# Patient Record
Sex: FEMALE | Race: ASIAN | NOT HISPANIC OR LATINO | Employment: FULL TIME | URBAN - METROPOLITAN AREA
[De-identification: names, ages, dates, MRNs, and addresses within clinical notes are randomized per-mention and may not be internally consistent; named-entity substitution may affect disease eponyms.]

---

## 2017-03-29 ENCOUNTER — TRANSFERRED RECORDS (OUTPATIENT)
Dept: HEALTH INFORMATION MANAGEMENT | Facility: CLINIC | Age: 32
End: 2017-03-29

## 2017-03-29 DIAGNOSIS — Z53.9 ERRONEOUS ENCOUNTER--DISREGARD: Primary | ICD-10-CM

## 2017-07-31 ENCOUNTER — OFFICE VISIT (OUTPATIENT)
Dept: URGENT CARE | Facility: URGENT CARE | Age: 32
End: 2017-07-31
Payer: COMMERCIAL

## 2017-07-31 VITALS
WEIGHT: 243.4 LBS | TEMPERATURE: 98.6 F | SYSTOLIC BLOOD PRESSURE: 112 MMHG | HEART RATE: 82 BPM | OXYGEN SATURATION: 98 % | HEIGHT: 63 IN | DIASTOLIC BLOOD PRESSURE: 74 MMHG | RESPIRATION RATE: 16 BRPM | BODY MASS INDEX: 43.12 KG/M2

## 2017-07-31 DIAGNOSIS — J98.01 BRONCHOSPASM: ICD-10-CM

## 2017-07-31 DIAGNOSIS — J06.9 VIRAL URI: Primary | ICD-10-CM

## 2017-07-31 PROCEDURE — 99213 OFFICE O/P EST LOW 20 MIN: CPT | Performed by: FAMILY MEDICINE

## 2017-07-31 RX ORDER — ALBUTEROL SULFATE 90 UG/1
2-3 AEROSOL, METERED RESPIRATORY (INHALATION) EVERY 4 HOURS PRN
Qty: 1 INHALER | Refills: 1 | Status: SHIPPED | OUTPATIENT
Start: 2017-07-31 | End: 2021-06-07

## 2017-07-31 ASSESSMENT — PAIN SCALES - GENERAL: PAINLEVEL: NO PAIN (0)

## 2017-07-31 NOTE — PROGRESS NOTES
Some of this note was populated by a medical assistant.      SUBJECTIVE:                                                    Eliz Fortune is a 31 year old female who presents to clinic today for the following health issues:      RESPIRATORY SYMPTOMS -- follow up, pt went to [facility] on 7/25/17 (strep was negative)       Duration: 7/23/17    Description  nasal congestion, sore throat, dry cough, fever, SOB, chest tightness, HA        Severity: severe    Accompanying signs and symptoms: body aches, trouble sleeping (related to breathing issue)    History (predisposing factors):  none    Precipitating or alleviating factors: sudafed, armando seltzer - seemed to help for a short period of time    Therapies tried and outcome:  Vitamins, OJ, Rest -       Denies heart of lung hx   Denies hx of PE or hospitalization or surgery.   Hx of bronchitis few years ago. Denies pneumonia.   Denies travel outside country.     Problem list and histories reviewed & adjusted, as indicated.  Additional history: as documented    Patient Active Problem List   Diagnosis     Back strain     History reviewed. No pertinent surgical history.    Social History   Substance Use Topics     Smoking status: Never Smoker     Smokeless tobacco: Never Used     Alcohol use No     History reviewed. No pertinent family history.      Current Outpatient Prescriptions   Medication Sig Dispense Refill     Ibuprofen (ADVIL PO)        Phenyleph-Doxylamine-DM-APAP (ARMANDO SELTZER PLUS PO)        No Known Allergies      Reviewed and updated as needed this visit by clinical staffTobacco  Allergies  Meds  Med Hx  Surg Hx  Fam Hx  Soc Hx      Reviewed and updated as needed this visit by Provider         ROS:  Constitutional, HEENT, cardiovascular, pulmonary, gi and gu systems are negative, except as otherwise noted.      OBJECTIVE:   /74 (BP Location: Left arm, Patient Position: Left side, Cuff Size: Adult Large)  Pulse 82  Temp 98.6  F (37  C) (Oral)  " Resp 16  Ht 1.594 m (5' 2.75\")  Wt 110.4 kg (243 lb 6.4 oz)  LMP 07/14/2017 (Exact Date)  SpO2 98%  BMI 43.46 kg/m2  Body mass index is 43.46 kg/(m^2).  GENERAL: healthy, alert and no distress  NECK: no adenopathy, no asymmetry, masses, or scars and thyroid normal to palpation  RESP: lungs clear to auscultation - minimal expiratory wheeze without appreciable rhonci   CV: regular rate and rhythm, normal S1 S2, no S3 or S4, no murmur, click or rub, no peripheral edema and peripheral pulses strong  ABDOMEN: soft, nontender, no hepatosplenomegaly, no masses and bowel sounds normal  MS: no gross musculoskeletal defects noted, no edema    Diagnostic Test Results:  none     ASSESSMENT/PLAN:       ICD-10-CM    1. Viral URI J06.9 albuterol (PROAIR HFA/PROVENTIL HFA/VENTOLIN HFA) 108 (90 BASE) MCG/ACT Inhaler    B97.89    2. Bronchospasm J98.01 albuterol (PROAIR HFA/PROVENTIL HFA/VENTOLIN HFA) 108 (90 BASE) MCG/ACT Inhaler        PLAN  Patient educational/instructional material provided including reasons for follow-up    The patient indicates understanding of these issues and agrees with the plan.  Emile Barnes MD      [facility]    "

## 2017-07-31 NOTE — MR AVS SNAPSHOT
"              After Visit Summary   2017    Eliz Fortune    MRN: [id number]           Patient Information     Date Of Birth          1985        Visit Information        Provider Department      2017 6:20 PM Emile Barnes MD [facility]        Today's Diagnoses     Viral URI    -  1    Bronchospasm           Follow-ups after your visit        Who to contact     If you have questions or need follow up information about today's clinic visit or your schedule please contact [facility] directly at [phone].  Normal or non-critical lab and imaging results will be communicated to you by IMT ([employer])hart, letter or phone within 4 business days after the clinic has received the results. If you do not hear from us within 7 days, please contact the clinic through IMT ([employer])hart or phone. If you have a critical or abnormal lab result, we will notify you by phone as soon as possible.  Submit refill requests through tagWALLET or call your pharmacy and they will forward the refill request to us. Please allow 3 business days for your refill to be completed.          Additional Information About Your Visit        MyChart Information     tagWALLET lets you send messages to your doctor, view your test results, renew your prescriptions, schedule appointments and more. To sign up, go to [email or web address] . Click on \"Log in\" on the left side of the screen, which will take you to the Welcome page. Then click on \"Sign up Now\" on the right side of the page.     You will be asked to enter the access code listed below, as well as some personal information. Please follow the directions to create your username and password.     Your access code is: [email or web address]  Expires: 10/29/2017  6:54 PM     Your access code will  in 90 days. If you need help or a new code, please call your [facility] or [phone].        Care EveryWhere ID     This is your Care EveryWhere ID. This could " "be used by other organizations to access your [place] medical records  [id number]        Your Vitals Were     Pulse Temperature Respirations Height Last Period Pulse Oximetry    82 98.6  F (37  C) (Oral) 16 1.594 m (5' 2.75\") 07/14/2017 (Exact Date) 98%    BMI (Body Mass Index)                   43.46 kg/m2            Blood Pressure from Last 3 Encounters:   07/31/17 112/74   04/29/15 107/73   04/28/15 114/80    Weight from Last 3 Encounters:   07/31/17 110.4 kg (243 lb 6.4 oz)   04/29/15 110.7 kg (244 lb)   04/28/15 110.7 kg (244 lb)              Today, you had the following     No orders found for display         Today's Medication Changes          These changes are accurate as of: 7/31/17  6:54 PM.  If you have any questions, ask your nurse or doctor.               Start taking these medicines.        Dose/Directions    albuterol 108 (90 BASE) MCG/ACT Inhaler   Commonly known as:  PROAIR HFA/PROVENTIL HFA/VENTOLIN HFA   Used for:  Viral URI, Bronchospasm   Started by:  Emile Barnes MD        Dose:  2-3 puff   Inhale 2-3 puffs into the lungs every 4 hours as needed for shortness of breath / dyspnea or wheezing   Quantity:  1 Inhaler   Refills:  1            Where to get your medicines      These medications were sent to [id number] - [place], [place] - [address]  [address] [address]     Phone:  [phone]     albuterol 108 (90 BASE) MCG/ACT Inhaler                Primary Care Provider Office Phone # Fax #    Parisa Birch [phone]       [address] [address]        Equal Access to Services     JAVIER GODWIN : Anayeli Hassan, waaxda luqadaha, qaybta kaalmada bernice, gerber beckham. So [facility] [phone].    ATENCIÓN: Si habla español, tiene a gutierrez disposición servicios gratuitos de asistencia lingüística. Llharvey al [phone].    We comply with applicable " federal civil rights laws and [place] laws. We do not discriminate on the basis of race, color, national origin, age, disability sex, sexual orientation or gender identity.            Thank you!     Thank you for choosing [facility]  for your care. Our goal is always to provide you with excellent care. Hearing back from our patients is one way we can continue to improve our services. Please take a few minutes to complete the written survey that you may receive in the mail after your visit with us. Thank you!             Your Updated Medication List - Protect others around you: Learn how to safely use, store and throw away your medicines at www.disposemymeds.org.          This list is accurate as of: 7/31/17  6:54 PM.  Always use your most recent med list.                   Brand Name Dispense Instructions for use Diagnosis    ADVIL PO           albuterol 108 (90 BASE) MCG/ACT Inhaler    PROAIR HFA/PROVENTIL HFA/VENTOLIN HFA    1 Inhaler    Inhale 2-3 puffs into the lungs every 4 hours as needed for shortness of breath / dyspnea or wheezing    Viral URI, Bronchospasm       ARMANDO SELTZER PLUS PO

## 2017-07-31 NOTE — NURSING NOTE
"Chief Complaint   Patient presents with     Pharyngitis     Cough       Initial /74 (BP Location: Left arm, Patient Position: Left side, Cuff Size: Adult Large)  Pulse 82  Temp 98.6  F (37  C) (Oral)  Resp 16  Ht 1.594 m (5' 2.75\")  Wt 110.4 kg (243 lb 6.4 oz)  LMP 07/14/2017 (Exact Date)  SpO2 98%  BMI 43.46 kg/m2 Estimated body mass index is 43.46 kg/(m^2) as calculated from the following:    Height as of this encounter: 1.594 m (5' 2.75\").    Weight as of this encounter: 110.4 kg (243 lb 6.4 oz).  Medication Reconciliation: complete     Will Soto ZARAGOZA      "

## 2017-07-31 NOTE — LETTER
[facility]  [address]  [facility] [address]  Phone: [phone]    July 31, 2017        Eliz Fortune  [address]  [place] [facility] [place] [address]          To whom it may concern:    RE: Eliz Fortune    Patient was seen and treated today at our clinic and having ongoing viral common cold symptoms. Therefore, she should stay home tomorrow and rest and possibly the following day as she is likely contagious. Return to work when cough and fever have improved. Follow-up if symptoms persist or worsen.          Sincerely,          Emile Barnes MD

## 2017-08-17 ENCOUNTER — OFFICE VISIT (OUTPATIENT)
Dept: FAMILY MEDICINE | Facility: CLINIC | Age: 32
End: 2017-08-17
Payer: COMMERCIAL

## 2017-08-17 VITALS
TEMPERATURE: 98.8 F | BODY MASS INDEX: 43.59 KG/M2 | DIASTOLIC BLOOD PRESSURE: 71 MMHG | SYSTOLIC BLOOD PRESSURE: 125 MMHG | OXYGEN SATURATION: 98 % | WEIGHT: 246 LBS | HEART RATE: 81 BPM | HEIGHT: 63 IN

## 2017-08-17 DIAGNOSIS — S61.213D LACERATION OF LEFT MIDDLE FINGER WITHOUT FOREIGN BODY WITHOUT DAMAGE TO NAIL, SUBSEQUENT ENCOUNTER: Primary | ICD-10-CM

## 2017-08-17 PROBLEM — E66.01 MORBID OBESITY WITH BMI OF 40.0-44.9, ADULT (H): Status: ACTIVE | Noted: 2017-08-17

## 2017-08-17 PROCEDURE — 99213 OFFICE O/P EST LOW 20 MIN: CPT | Performed by: FAMILY MEDICINE

## 2017-08-17 ASSESSMENT — PAIN SCALES - GENERAL: PAINLEVEL: NO PAIN (0)

## 2017-08-17 NOTE — PROGRESS NOTES
"  SUBJECTIVE:   Eliz Fortune is a 31 year old female who presents to clinic today for the following health issues:      ED/UC Followup:    Facility:  [facility]   Date of visit: 8/9/17  Reason for visit: finger laceration  Current Status: better     Concern - laceration  Onset: 8/9/17    Description:   Left middle finger laceration due to duffel bag strap digging into her finger    Intensity: mild    Progression of Symptoms:  improving    Accompanying Signs & Symptoms:  Some numbness  Smell (\"like dead flesh\")    Previous history of similar problem:   No     Precipitating factors:   Worsened by: NA    Alleviating factors:  Improved by: None.    Therapies Tried and outcome: had stitches.      ROS:  C: NEGATIVE for fever, chills, change in weight  E/M: NEGATIVE for ear, mouth and throat problems  R: NEGATIVE for significant cough or SOB  CV: NEGATIVE for chest pain, palpitations or peripheral edema  ROS otherwise negative    Any history above obtained by the Medical Assistant was reviewed by Dr. Jace Patiño MD, and edited when necessary.    This document serves as a record of the services and decisions personally performed and made by Dr. Patiño. It was created on his behalf by Stephanie Watson, a trained medical scribe. The creation of this document is based the provider's statements to the medical scribe.  Stephanie Watson August 17, 2017 5:35 PM     OBJECTIVE:                                                    /71 (BP Location: Right arm, Patient Position: Chair, Cuff Size: Adult Large)  Pulse 81  Temp 98.8  F (37.1  C) (Oral)  Ht 1.594 m (5' 2.75\")  Wt 111.6 kg (246 lb)  LMP 07/14/2017  SpO2 98%  BMI 43.92 kg/m2   Body mass index is 43.92 kg/(m^2).     GENERAL: healthy, alert and no distress  EYES: Eyes grossly normal to inspection, PERRL, EOMI, sclerae white and conjunctivae normal  MS: no gross musculoskeletal defects noted, no edema  SKIN: Approximately 12 mm linear laceration across the pad of " the distal middle finger. Skin is well approximated without evidence of bleeding or infection.   NEURO: Normal strength and tone, sensory exam grossly normal, mentation intact, oriented times 3 and cranial nerves 2-12 intact  PSYCH: mentation appears normal, affect normal/bright     Diagnostic Test Results:  none      ASSESSMENT/PLAN:                                                      (U48.179A) Laceration of left middle finger without foreign body without damage to nail, subsequent encounter  (primary encounter diagnosis)  Comment: Looks like this was also a friction burn injury along the same line.  Plan: The wound was cleansed with rubbing alcohol. The 3 sutures were removed without difficulty. Antibiotic ointment and bandage applied. Wound care discussed with the patient during the procedure. Follow up as needed. Patient is cautioned that the return of sensation will take several weeks.      The information in this document, created by the medical scribe for me, accurately reflects the services I personally performed and the decisions made by me. I have reviewed and approved this document for accuracy prior to leaving the patient care area. August 17, 2017 5:35 PM     Jace Patiño MD

## 2017-08-17 NOTE — MR AVS SNAPSHOT
"              After Visit Summary   8/17/2017    Eliz Fortune    MRN: [id number]           Patient Information     Date Of Birth          1985        Visit Information        Provider Department      8/17/2017 5:20 PM Jace Patiño MD [facility]        Today's Diagnoses     Laceration of left middle finger without foreign body without damage to nail, subsequent encounter    -  1      Care Instructions    Chief Complaint   Patient presents with     Hospital F/U     suture removal       Initial /71 (BP Location: Right arm, Patient Position: Chair, Cuff Size: Adult Large)  Pulse 81  Temp 98.8  F (37.1  C) (Oral)  Ht 5' 2.75\" (1.594 m)  Wt 246 lb (111.6 kg)  LMP 07/14/2017  SpO2 98%  BMI 43.92 kg/m2 Estimated body mass index is 43.92 kg/(m^2) as calculated from the following:    Height as of this encounter: 5' 2.75\" (1.594 m).    Weight as of this encounter: 246 lb (111.6 kg).  Medication Reconciliation: complete     Dorys Whitehead MA              Follow-ups after your visit        Follow-up notes from your care team     Return if symptoms worsen or fail to improve.      Who to contact     If you have questions or need follow up information about today's clinic visit or your schedule please contact [facility] directly at [phone].  Normal or non-critical lab and imaging results will be communicated to you by careersmorehart, letter or phone within 4 business days after the clinic has received the results. If you do not hear from us within 7 days, please contact the clinic through careersmorehart or phone. If you have a critical or abnormal lab result, we will notify you by phone as soon as possible.  Submit refill requests through [employer] or call your pharmacy and they will forward the refill request to us. Please allow 3 business days for your refill to be completed.          Additional Information About Your Visit        MyChart Information     [employer] lets you send " "messages to your doctor, view your test results, renew your prescriptions, schedule appointments and more. To sign up, go to [email or web address] . Click on \"Log in\" on the left side of the screen, which will take you to the Welcome page. Then click on \"Sign up Now\" on the right side of the page.     You will be asked to enter the access code listed below, as well as some personal information. Please follow the directions to create your username and password.     Your access code is: [email or web address]  Expires: 10/29/2017  6:54 PM     Your access code will  in 90 days. If you need help or a new code, please call your [place] clinic or [phone].        Care EveryWhere ID     This is your Care EveryWhere ID. This could be used by other organizations to access your [facility] medical records  [id number]        Your Vitals Were     Pulse Temperature Height Last Period Pulse Oximetry BMI (Body Mass Index)    81 98.8  F (37.1  C) (Oral) 5' 2.75\" (1.594 m) 2017 98% 43.92 kg/m2       Blood Pressure from Last 3 Encounters:   17 125/71   17 112/74   04/29/15 107/73    Weight from Last 3 Encounters:   17 246 lb (111.6 kg)   17 243 lb 6.4 oz (110.4 kg)   04/29/15 244 lb (110.7 kg)              Today, you had the following     No orders found for display       Primary Care Provider Office Phone # Fax #    Parisa Birch [phone] [address]  [facility] [address]        Equal Access to Services     [facility] CITLALI AH: Hadwagner Hassan, jeimy ruvalcaba, eb vegaalmanitish queen, gerber beckham. So [facility] [phone].    ATENCIÓN: Si habla español, tiene a gutierrez disposición servicios gratuitos de asistencia lingüística. Llame al [phone].    We comply with applicable federal civil rights laws and [place] laws. We do not discriminate on the basis of race, color, national origin, age, disability sex, sexual orientation or " gender identity.            Thank you!     Thank you for choosing [facility]  for your care. Our goal is always to provide you with excellent care. Hearing back from our patients is one way we can continue to improve our services. Please take a few minutes to complete the written survey that you may receive in the mail after your visit with us. Thank you!             Your Updated Medication List - Protect others around you: Learn how to safely use, store and throw away your medicines at www.disposemymeds.org.          This list is accurate as of: 8/17/17  6:34 PM.  Always use your most recent med list.                   Brand Name Dispense Instructions for use Diagnosis    albuterol 108 (90 BASE) MCG/ACT Inhaler    PROAIR HFA/PROVENTIL HFA/VENTOLIN HFA    1 Inhaler    Inhale 2-3 puffs into the lungs every 4 hours as needed for shortness of breath / dyspnea or wheezing    Viral URI, Bronchospasm

## 2017-08-17 NOTE — PATIENT INSTRUCTIONS
"Chief Complaint   Patient presents with     Hospital F/U     suture removal       Initial /71 (BP Location: Right arm, Patient Position: Chair, Cuff Size: Adult Large)  Pulse 81  Temp 98.8  F (37.1  C) (Oral)  Ht 5' 2.75\" (1.594 m)  Wt 246 lb (111.6 kg)  LMP 07/14/2017  SpO2 98%  BMI 43.92 kg/m2 Estimated body mass index is 43.92 kg/(m^2) as calculated from the following:    Height as of this encounter: 5' 2.75\" (1.594 m).    Weight as of this encounter: 246 lb (111.6 kg).  Medication Reconciliation: complete     Dorys Whitehead MA      "

## 2018-07-24 ENCOUNTER — RADIANT APPOINTMENT (OUTPATIENT)
Dept: ULTRASOUND IMAGING | Facility: CLINIC | Age: 33
End: 2018-07-24
Attending: PHYSICIAN ASSISTANT
Payer: COMMERCIAL

## 2018-07-24 ENCOUNTER — OFFICE VISIT (OUTPATIENT)
Dept: FAMILY MEDICINE | Facility: CLINIC | Age: 33
End: 2018-07-24
Payer: COMMERCIAL

## 2018-07-24 VITALS
HEART RATE: 78 BPM | HEIGHT: 63 IN | WEIGHT: 235.8 LBS | OXYGEN SATURATION: 97 % | TEMPERATURE: 98.7 F | DIASTOLIC BLOOD PRESSURE: 81 MMHG | BODY MASS INDEX: 41.78 KG/M2 | SYSTOLIC BLOOD PRESSURE: 128 MMHG

## 2018-07-24 DIAGNOSIS — E66.01 MORBID OBESITY WITH BMI OF 40.0-44.9, ADULT (H): ICD-10-CM

## 2018-07-24 DIAGNOSIS — Z32.01 PREGNANCY CONFIRMED BY POSITIVE URINE TEST: ICD-10-CM

## 2018-07-24 DIAGNOSIS — Z32.01 PREGNANCY CONFIRMED BY POSITIVE URINE TEST: Primary | ICD-10-CM

## 2018-07-24 DIAGNOSIS — M79.662 PAIN OF LEFT LOWER LEG: ICD-10-CM

## 2018-07-24 LAB — BETA HCG QUAL IFA URINE: POSITIVE

## 2018-07-24 PROCEDURE — 99214 OFFICE O/P EST MOD 30 MIN: CPT | Performed by: PHYSICIAN ASSISTANT

## 2018-07-24 PROCEDURE — 84703 CHORIONIC GONADOTROPIN ASSAY: CPT | Performed by: PHYSICIAN ASSISTANT

## 2018-07-24 PROCEDURE — 93971 EXTREMITY STUDY: CPT | Mod: LT

## 2018-07-24 RX ORDER — CYCLOBENZAPRINE HCL 10 MG
10 TABLET ORAL 2 TIMES DAILY PRN
Qty: 30 TABLET | Refills: 0 | Status: SHIPPED | OUTPATIENT
Start: 2018-07-24 | End: 2021-06-07

## 2018-07-24 RX ORDER — PRENATAL VIT/IRON FUM/FOLIC AC 27MG-0.8MG
1 TABLET ORAL DAILY
Qty: 100 TABLET | Refills: 3 | Status: SHIPPED | OUTPATIENT
Start: 2018-07-24 | End: 2021-06-07

## 2018-07-24 ASSESSMENT — PAIN SCALES - GENERAL: PAINLEVEL: MODERATE PAIN (4)

## 2018-07-24 NOTE — MR AVS SNAPSHOT
After Visit Summary   7/24/2018    Eliz Fortune    MRN: [id number]           Patient Information     Date Of Birth          1985        Visit Information        Provider Department      7/24/2018 9:20 AM Nida Shankar PA-C [facility]        Today's Diagnoses     Pregnancy confirmed by positive urine test    -  1    Pain of left lower leg        Morbid obesity with BMI of 40.0-44.9, adult (H)          Care Instructions    JOYCE 3/22/19  EGA 5 week 4 days     Make appointment with OBGYN at 8 weeks of gestation in 2.5 weeks   Start prenatal vitamins     For leg,  Take Tylenol 500 mg every 6 hours as needed for pain  Flexeril 10 mg twice a day as needed for spasms    Get US done today to rule out blood clot  [facility]: arrive at 12:45pm           Follow-ups after your visit        Additional Services     OB/GYN REFERRAL       Your provider has referred you to:  [facility]: [facility] [phone]   [email or web address]     Please be aware that coverage of these services is subject to the terms and limitations of your health insurance plan.  Call member services at your health plan with any benefit or coverage questions.      Please bring the following with you to your appointment:    (1) Any X-Rays, CTs or MRIs which have been performed.  Contact the facility where they were done to arrange for  prior to your scheduled appointment.   (2) List of current medications   (3) This referral request   (4) Any documents/labs given to you for this referral                  Your next 10 appointments already scheduled     Jul 24, 2018  1:00 PM CDT   US LOWER EXTREMITY VENOUS DUPLEX LEFT with [id number]   [facility]    [address] [place] [id number] [phone]           Please bring a list of your medicines (including vitamins,  "minerals and over-the-counter drugs). Also, tell your doctor about any allergies you may have. Wear comfortable clothes and leave your valuables at home.  You do not need to do anything special to prepare for your exam.  Please call the Imaging Department at your exam site with any questions.              Future tests that were ordered for you today     Open Future Orders        Priority Expected Expires Ordered    US Lower Extremity Venous Duplex Left Routine  2019            Who to contact     If you have questions or need follow up information about today's clinic visit or your schedule please contact [facility] directly at [phone].  Normal or non-critical lab and imaging results will be communicated to you by MyChart, letter or phone within 4 business days after the clinic has received the results. If you do not hear from us within 7 days, please contact the clinic through [employer] or phone. If you have a critical or abnormal lab result, we will notify you by phone as soon as possible.  Submit refill requests through [email or web address] or call your pharmacy and they will forward the refill request to us. Please allow 3 business days for your refill to be completed.          Additional Information About Your Visit        [employer] Information     [email or web address] lets you send messages to your doctor, view your test results, renew your prescriptions, schedule appointments and more. To sign up, go to [email or web address] . Click on \"Log in\" on the left side of the screen, which will take you to the Welcome page. Then click on \"Sign up Now\" on the right side of the page.     You will be asked to enter the access code listed below, as well as some personal information. Please follow the directions to create your username and password.     Your access code is: [email or web address]  Expires: 10/22/2018 10:03 AM     Your access code will  in 90 days. If you need help or a new code, please call your " "[facility] or [phone].        Care EveryWhere ID     This is your Care EveryWhere ID. This could be used by other organizations to access your [facility] medical records  [id number]        Your Vitals Were     Pulse Temperature Height Last Period Pulse Oximetry BMI (Body Mass Index)    78 98.7  F (37.1  C) (Oral) 5' 3\" (1.6 m) 06/15/2018 (Approximate) 97% 41.77 kg/m2       Blood Pressure from Last 3 Encounters:   07/24/18 128/81   08/17/17 125/71   07/31/17 112/74    Weight from Last 3 Encounters:   07/24/18 235 lb 12.8 oz (107 kg)   08/17/17 246 lb (111.6 kg)   07/31/17 243 lb 6.4 oz (110.4 kg)              We Performed the Following     Beta HCG Qual, Urine - [facility] ([id number])     OB/GYN REFERRAL          Today's Medication Changes          These changes are accurate as of 7/24/18 10:03 AM.  If you have any questions, ask your nurse or doctor.               Start taking these medicines.        Dose/Directions    prenatal multivitamin plus iron 27-0.8 MG Tabs per tablet   Used for:  Pregnancy confirmed by positive urine test   Started by:  Nida Shankar PA-C        Dose:  1 tablet   Take 1 tablet by mouth daily   Quantity:  100 tablet   Refills:  3            Where to get your medicines      These medications were sent to [id number] [place], [place] - [address]  [address] [facility] [address]     Phone:  [phone]     prenatal multivitamin plus iron 27-0.8 MG Tabs per tablet                Primary Care Provider Office Phone # Fax #    Parisa Birch [phone] [address] [facility] [address] [facility] [facility] [address]        Equal Access to Services     [facility] CITLALI : Anayeli coronado Somisty, waaxda luqadaha, qaybta kaalmada adeshirleyyanitish, gerber beckham. So [facility] [phone].    ATENCIÓN: Si habla español, tiene a gutierrez disposición servicios gratuitos de asistencia lingüística. Llame al [phone].    We " comply with applicable federal civil rights laws and [place] laws. We do not discriminate on the basis of race, color, national origin, age, disability, sex, sexual orientation, or gender identity.            Thank you!     Thank you for choosing [facility]  for your care. Our goal is always to provide you with excellent care. Hearing back from our patients is one way we can continue to improve our services. Please take a few minutes to complete the written survey that you may receive in the mail after your visit with us. Thank you!             Your Updated Medication List - Protect others around you: Learn how to safely use, store and throw away your medicines at www.disposemymeds.org.          This list is accurate as of 7/24/18 10:03 AM.  Always use your most recent med list.                   Brand Name Dispense Instructions for use Diagnosis    albuterol 108 (90 Base) MCG/ACT Inhaler    PROAIR HFA/PROVENTIL HFA/VENTOLIN HFA    1 Inhaler    Inhale 2-3 puffs into the lungs every 4 hours as needed for shortness of breath / dyspnea or wheezing    Viral URI, Bronchospasm       prenatal multivitamin plus iron 27-0.8 MG Tabs per tablet     100 tablet    Take 1 tablet by mouth daily    Pregnancy confirmed by positive urine test

## 2018-07-24 NOTE — PROGRESS NOTES
SUBJECTIVE:   Eliz Fortune is a 32 year old female who presents to clinic today for the following health issues:    Concern - Left leg pain  Onset: x5 days     Description:   Left calf pain    Intensity: severe    Progression of Symptoms:  worsening    Accompanying Signs & Symptoms:  Pt states it is a cramping pain that can radiate in thigh.     Previous history of similar problem:   Pt states she has had alcira horses before     Precipitating factors:   Worsened by: laying down     Alleviating factors:  Improved by: heat   Patient denies any trauma. Patient reports that she has been laying a lot lately, has not done any gardening or, no exercise, no lifting, no fall. She does do regular housework.   Therapies Tried and outcome: increasing potassium, ice, heat, muscle rub, warm shower, massage, tylenol, and advil       Confirmation of Pregnancy:  LMP: 6/15/2018  Pt states she took a at home pregnancy test and it was positive.  Pt has hx of PCOS. Pt has been trying to get pregnant for multiple years        Problem list and histories reviewed & adjusted, as indicated.  Additional history: as documented    Patient Active Problem List   Diagnosis     Morbid obesity with BMI of 40.0-44.9, adult (H)     History reviewed. No pertinent surgical history.    Social History   Substance Use Topics     Smoking status: Never Smoker     Smokeless tobacco: Never Used     Alcohol use No     History reviewed. No pertinent family history.      Current Outpatient Prescriptions   Medication Sig Dispense Refill     albuterol (PROAIR HFA/PROVENTIL HFA/VENTOLIN HFA) 108 (90 BASE) MCG/ACT Inhaler Inhale 2-3 puffs into the lungs every 4 hours as needed for shortness of breath / dyspnea or wheezing (Patient not taking: Reported on 7/24/2018) 1 Inhaler 1     cyclobenzaprine (FLEXERIL) 10 MG tablet Take 1 tablet (10 mg) by mouth 2 times daily as needed for muscle spasms 30 tablet 0     Prenatal Vit-Fe Fumarate-FA (PRENATAL MULTIVITAMIN  "PLUS IRON) 27-0.8 MG TABS per tablet Take 1 tablet by mouth daily 100 tablet 3     No Known Allergies    Reviewed and updated as needed this visit by clinical staff  Tobacco  Allergies  Meds  Problems  Med Hx  Surg Hx  Fam Hx  Soc Hx        Reviewed and updated as needed this visit by Provider  Allergies  Meds  Problems         ROS:  Constitutional, HEENT, cardiovascular, pulmonary, GI, , musculoskeletal, neuro, skin, endocrine and psych systems are negative, except as otherwise noted.    OBJECTIVE:     /81 (BP Location: Right arm, Patient Position: Sitting, Cuff Size: Adult Regular)  Pulse 78  Temp 98.7  F (37.1  C) (Oral)  Ht 5' 3\" (1.6 m)  Wt 235 lb 12.8 oz (107 kg)  LMP 06/15/2018 (Approximate)  SpO2 97%  BMI 41.77 kg/m2  Body mass index is 41.77 kg/(m^2).  GENERAL: healthy, alert and no distress  NECK: no adenopathy, no asymmetry, masses, or scars and thyroid normal to palpation  RESP: lungs clear to auscultation - no rales, rhonchi or wheezes  CV: regular rate and rhythm, normal S1 S2, no S3 or S4, no murmur, click or rub, no peripheral edema and peripheral pulses strong  ABDOMEN: soft, nontender, no hepatosplenomegaly, no masses and bowel sounds normal  MS: no gross musculoskeletal defects noted, no edema, left leg, no swelling, no erythema, normal temperature to touch, Homans test is positive, very tender calf on palpation     Diagnostic Test Results:  Results for orders placed or performed in visit on 07/24/18 (from the past 24 hour(s))   Beta HCG Qual, Urine - [facility] ([id number])   Result Value Ref Range    Beta HCG Qual IFA Urine Positive (A) NEG^Negative        Referred to have US of left lower leg    ASSESSMENT/PLAN:       ICD-10-CM    1. Pregnancy confirmed by positive urine test Z32.01 OB/GYN REFERRAL     US Lower Extremity Venous Duplex Left     Prenatal Vit-Fe Fumarate-FA (PRENATAL MULTIVITAMIN PLUS IRON) 27-0.8 MG TABS per tablet   2. Pain of left lower leg " M79.662 US Lower Extremity Venous Duplex Left     cyclobenzaprine (FLEXERIL) 10 MG tablet   3. Morbid obesity with BMI of 40.0-44.9, adult (H) E66.01     Z68.41      JOYCE 3/22/19  EGA 5 week 4 days     Make appointment with OBGYN at 8 weeks of gestation in 2.5 weeks   Start prenatal vitamins     For leg,  Take Tylenol 500 mg every 6 hours as needed for pain  Flexeril 10 mg twice a day as needed for spasms    Get US done today to rule out blood clot  [facility]: arrive at 12:45pm     Update: patient was called with results at 6:50 pm on 7/24/18  US was negative for DVT.   Patient was advised to continue to  Take Tylenol 500 mg every 6 hours as needed for pain  Flexeril 10 mg twice a day as needed for spasms  Heating pads  Massage and stretches  Follow up if not better in 1 week or sooner as needed .     Nida Shankar PA-C  [facility]

## 2018-07-24 NOTE — PATIENT INSTRUCTIONS
JOYCE 3/22/19  EGA 5 week 4 days     Make appointment with OBGYN at 8 weeks of gestation in 2.5 weeks   Start prenatal vitamins     For leg,  Take Tylenol 500 mg every 6 hours as needed for pain  Flexeril 10 mg twice a day as needed for spasms    Get US done today to rule out blood clot  [facility]: arrive at 12:45pm

## 2020-10-01 ENCOUNTER — OFFICE VISIT (OUTPATIENT)
Dept: URGENT CARE | Facility: URGENT CARE | Age: 35
End: 2020-10-01
Payer: COMMERCIAL

## 2020-10-01 VITALS
DIASTOLIC BLOOD PRESSURE: 87 MMHG | BODY MASS INDEX: 42.34 KG/M2 | TEMPERATURE: 98.2 F | SYSTOLIC BLOOD PRESSURE: 133 MMHG | WEIGHT: 239 LBS | OXYGEN SATURATION: 98 % | HEART RATE: 73 BPM

## 2020-10-01 DIAGNOSIS — R00.2 PALPITATIONS: Primary | ICD-10-CM

## 2020-10-01 DIAGNOSIS — R42 DIZZINESS: ICD-10-CM

## 2020-10-01 PROCEDURE — 99215 OFFICE O/P EST HI 40 MIN: CPT | Performed by: PHYSICIAN ASSISTANT

## 2020-10-01 PROCEDURE — 93000 ELECTROCARDIOGRAM COMPLETE: CPT | Performed by: PHYSICIAN ASSISTANT

## 2020-10-01 ASSESSMENT — ENCOUNTER SYMPTOMS
MYALGIAS: 0
JOINT SWELLING: 0
CHILLS: 0
ALLERGIC/IMMUNOLOGIC NEGATIVE: 1
SHORTNESS OF BREATH: 0
ENDOCRINE NEGATIVE: 1
COUGH: 0
NECK STIFFNESS: 0
MUSCULOSKELETAL NEGATIVE: 1
NECK PAIN: 0
FEVER: 0
RESPIRATORY NEGATIVE: 1
WOUND: 0
BRUISES/BLEEDS EASILY: 0
LIGHT-HEADEDNESS: 0
BACK PAIN: 0
RHINORRHEA: 0
WEAKNESS: 0
HEMATOLOGIC/LYMPHATIC NEGATIVE: 1
DIZZINESS: 1
EYES NEGATIVE: 1
DIARRHEA: 0
PALPITATIONS: 1
ARTHRALGIAS: 0
VOMITING: 0
NAUSEA: 0
HEADACHES: 0
SORE THROAT: 0

## 2020-10-01 ASSESSMENT — PAIN SCALES - GENERAL: PAINLEVEL: NO PAIN (0)

## 2020-10-01 NOTE — PROGRESS NOTES
Chief Complaint:    Chief Complaint   Patient presents with     Palpitations     sounds like a murmur this morning, an hour ago chest got really cold and feel dizzy      Dizziness       HPI: Eliz Fortune is an 34 year old female who presents for evaluation and treatment of palpitations ad dizziness.  Symptoms started this morning.  Roughly 1 hour ago she began to have dizziness.  She has had episodes of this in the past, but states that this feels different.  Symptoms started after the birth of her first child.  She has never been seen for the symptoms before.  Patient and her  recently moved here.  She denies any chest pain, or shortness of breath.  No recent illness.  No fever, diarrhea, nausea or vomiting.        ROS:      Review of Systems   Constitutional: Negative for chills and fever.   HENT: Negative for congestion, ear pain, rhinorrhea and sore throat.    Eyes: Negative.    Respiratory: Negative.  Negative for cough and shortness of breath.    Cardiovascular: Positive for palpitations. Negative for chest pain.   Gastrointestinal: Negative for diarrhea, nausea and vomiting.   Endocrine: Negative.    Genitourinary: Negative.    Musculoskeletal: Negative.  Negative for arthralgias, back pain, joint swelling, myalgias, neck pain and neck stiffness.   Skin: Negative.  Negative for rash and wound.   Allergic/Immunologic: Negative.  Negative for immunocompromised state.   Neurological: Positive for dizziness. Negative for weakness, light-headedness and headaches.   Hematological: Negative.  Does not bruise/bleed easily.        Family History   History reviewed. No pertinent family history.    Social History  Social History     Socioeconomic History     Marital status:      Spouse name: Not on file     Number of children: Not on file     Years of education: Not on file     Highest education level: Not on file   Occupational History     Not on file   Social Needs     Financial resource strain: Not  on file     Food insecurity     Worry: Not on file     Inability: Not on file     Transportation needs     Medical: Not on file     Non-medical: Not on file   Tobacco Use     Smoking status: Never Smoker     Smokeless tobacco: Never Used   Substance and Sexual Activity     Alcohol use: No     Drug use: No     Sexual activity: Not on file   Lifestyle     Physical activity     Days per week: Not on file     Minutes per session: Not on file     Stress: Not on file   Relationships     Social connections     Talks on phone: Not on file     Gets together: Not on file     Attends Orthodoxy service: Not on file     Active member of club or organization: Not on file     Attends meetings of clubs or organizations: Not on file     Relationship status: Not on file     Intimate partner violence     Fear of current or ex partner: Not on file     Emotionally abused: Not on file     Physically abused: Not on file     Forced sexual activity: Not on file   Other Topics Concern     Parent/sibling w/ CABG, MI or angioplasty before 65F 55M? Not Asked   Social History Narrative     Not on file        Surgical History:  No past surgical history on file.     Problem List:  Patient Active Problem List   Diagnosis     Morbid obesity with BMI of 40.0-44.9, adult (H)        Allergies:  No Known Allergies     Current Meds:    Current Outpatient Medications:      Prenatal Vit-Fe Fumarate-FA (PRENATAL MULTIVITAMIN PLUS IRON) 27-0.8 MG TABS per tablet, Take 1 tablet by mouth daily, Disp: 100 tablet, Rfl: 3     albuterol (PROAIR HFA/PROVENTIL HFA/VENTOLIN HFA) 108 (90 BASE) MCG/ACT Inhaler, Inhale 2-3 puffs into the lungs every 4 hours as needed for shortness of breath / dyspnea or wheezing (Patient not taking: Reported on 7/24/2018), Disp: 1 Inhaler, Rfl: 1     cyclobenzaprine (FLEXERIL) 10 MG tablet, Take 1 tablet (10 mg) by mouth 2 times daily as needed for muscle spasms (Patient not taking: Reported on 10/1/2020), Disp: 30 tablet, Rfl: 0      PHYSICAL EXAM:     Vital signs noted and reviewed by Paulo Bui PA-C  /87 (BP Location: Right arm, Patient Position: Sitting, Cuff Size: Adult Large)   Pulse 73   Temp 98.2  F (36.8  C) (Tympanic)   Wt 108.4 kg (239 lb)   SpO2 98%   Breastfeeding Yes   BMI 42.34 kg/m       PEFR:    Physical Exam  Vitals signs and nursing note reviewed.   Constitutional:       General: She is not in acute distress.     Appearance: She is well-developed. She is not ill-appearing, toxic-appearing or diaphoretic.   HENT:      Head: Normocephalic and atraumatic.      Right Ear: Tympanic membrane and external ear normal. No drainage, swelling or tenderness. Tympanic membrane is not perforated, erythematous, retracted or bulging.      Left Ear: Tympanic membrane and external ear normal. No drainage, swelling or tenderness. Tympanic membrane is not perforated, erythematous, retracted or bulging.      Nose: No mucosal edema, congestion or rhinorrhea.      Right Sinus: No maxillary sinus tenderness or frontal sinus tenderness.      Left Sinus: No maxillary sinus tenderness or frontal sinus tenderness.      Mouth/Throat:      Pharynx: No pharyngeal swelling, oropharyngeal exudate, posterior oropharyngeal erythema or uvula swelling.      Tonsils: No tonsillar abscesses.   Eyes:      Pupils: Pupils are equal, round, and reactive to light.   Neck:      Musculoskeletal: Full passive range of motion without pain, normal range of motion and neck supple.      Trachea: Trachea normal.   Cardiovascular:      Rate and Rhythm: Normal rate and regular rhythm.      Heart sounds: Normal heart sounds, S1 normal and S2 normal. No murmur. No friction rub. No gallop.    Pulmonary:      Effort: Pulmonary effort is normal. No respiratory distress.      Breath sounds: Normal breath sounds. No decreased breath sounds, wheezing, rhonchi or rales.   Abdominal:      General: Bowel sounds are normal. There is no distension.      Palpations: Abdomen  is soft. Abdomen is not rigid. There is no mass.      Tenderness: There is no abdominal tenderness. There is no guarding or rebound.   Lymphadenopathy:      Cervical: No cervical adenopathy.   Skin:     General: Skin is warm and dry.   Neurological:      Mental Status: She is alert and oriented to person, place, and time.      Cranial Nerves: No cranial nerve deficit.      Deep Tendon Reflexes: Reflexes are normal and symmetric.   Psychiatric:         Behavior: Behavior normal. Behavior is cooperative.         Thought Content: Thought content normal.         Judgment: Judgment normal.          Labs:     No results found for any visits on 10/01/20.       EKG Interpretation:      Interpreted by Paulo Bui PA-C  Time reviewed:16:06   Symptoms at time of EKG: palpitations and dizziness.   Rhythm: Normal sinus   Rate: 68  Axis: Normal  Ectopy: None  Conduction: Normal  ST Segments/ T Waves: T wave inversion V1, V2 and V3  Q Waves: None  Comparison to prior: No old EKG available    Clinical Impression: non-specific EKG    Medical Decision Making:    Differential Diagnosis:  Cardiac: Acute MI  PE  Palpitations  Atrial Fibrillation  Atrial Flutter  Supraventricular Tachycardia  Pneumonia  Pericarditis  Panic/Anxiety      ASSESSMENT:     1. Palpitations    2. Dizziness           PLAN:     Patient presents with 8 hours of heart palpitations and dizziness.  Patient is in no acute distress.  She is afebrile with stable vital signs.  EKG was negative for any acute ST or ischemic changes per my read.  At this time I cannot rule out cardiac cause of her symptoms.    Patient instructed to go to the ED now for further evaluation, labs, and possible imaging.  Patient declined EMS transport.  Staff assisted patient in the room with getting appointment to establish care with PCP for further evaluation in the next week.  Patient verbalized understanding and agreed with this plan.  Patient was discharged in stable condition.      Paulo Bui PA-C  10/1/2020, 3:45 PM

## 2020-10-05 ENCOUNTER — VIRTUAL VISIT (OUTPATIENT)
Dept: FAMILY MEDICINE | Facility: CLINIC | Age: 35
End: 2020-10-05
Payer: COMMERCIAL

## 2020-10-05 DIAGNOSIS — N61.0 MASTITIS, RIGHT, ACUTE: Primary | ICD-10-CM

## 2020-10-05 DIAGNOSIS — R23.8 DRY SCALP: ICD-10-CM

## 2020-10-05 PROCEDURE — 99213 OFFICE O/P EST LOW 20 MIN: CPT | Mod: GT | Performed by: NURSE PRACTITIONER

## 2020-10-05 RX ORDER — CEPHALEXIN 500 MG/1
500 CAPSULE ORAL 4 TIMES DAILY
Qty: 20 CAPSULE | Refills: 0 | Status: SHIPPED | OUTPATIENT
Start: 2020-10-05 | End: 2021-06-07

## 2020-10-05 RX ORDER — CEPHALEXIN 500 MG/1
500 CAPSULE ORAL 4 TIMES DAILY
COMMUNITY
End: 2020-10-05

## 2020-10-05 NOTE — PROGRESS NOTES
"Eliz Fortune is a 34 year old female who is being evaluated via a billable video visit.      The patient has been notified of following:     \"This video visit will be conducted via a call between you and your physician/provider. We have found that certain health care needs can be provided without the need for an in-person physical exam.  This service lets us provide the care you need with a video conversation.  If a prescription is necessary we can send it directly to your pharmacy.  If lab work is needed we can place an order for that and you can then stop by our lab to have the test done at a later time.    Video visits are billed at different rates depending on your insurance coverage.  Please reach out to your insurance provider with any questions.    If during the course of the call the physician/provider feels a video visit is not appropriate, you will not be charged for this service.\"    Patient has given verbal consent for Video visit? Yes  How would you like to obtain your AVS? MyChart  If you are dropped from the video visit, the video invite should be resent to:   Will anyone else be joining your video visit? No    Subjective     Eliz Fortune is a 34 year old female who presents today via video visit for the following health issues:    HPI            Video Start Time: 2:04 pm    Pleasant 34-year-old female initiated virtual visit to discuss her recent ED visit.  She had an EKG and blood work which was unremarkable.  She is 3 months postpartum.  She went back to work last week and notes an increase in stress and decrease in sleep. The only time her heart rate was ever elevated was on Thursday, the day of her ED visit.  No sexual activity since giving birth 3 months ago and therefore no chance of pregnancy. She has not had any more palpitations.  The day after her ED visit she had some body chills and breast pain.  She saw her doctor who prescribed Keflex 500 mg p.o. 4 times daily x5 days for " mastitis.  Her fever on Friday was 103.9.  She no longer has a palpable lump in her breast but it continues to be tender and warm.  She is not had a fever since Friday.  She has been massaging her breasts and pumping.    She also notes dry scalp and scabs on her head for several years.  She is tried coconut oil, head and shoulders, Selsun Blue, Facundo tea tree and nothing seems to help.  She is requesting referral to dermatology.    Review of Systems   Constitutional, HEENT, cardiovascular, pulmonary, GI, , musculoskeletal, neuro, skin, endocrine and psych systems are negative, except as otherwise noted.      Objective           Vitals:  No vitals were obtained today due to virtual visit.    Physical Exam     GENERAL: Healthy, alert and no distress  EYES: Eyes grossly normal to inspection.  No discharge or erythema, or obvious scleral/conjunctival abnormalities.  RESP: No audible wheeze, cough, or visible cyanosis.  No visible retractions or increased work of breathing.    SKIN: Visible skin clear. No significant rash, abnormal pigmentation or lesions.  NEURO: Cranial nerves grossly intact.  Mentation and speech appropriate for age.  PSYCH: Mentation appears normal, affect normal/bright, judgement and insight intact, normal speech and appearance well-groomed.              Assessment & Plan     Mastitis, right, acute  Will extend antibiotics to 10 days total. Continue to pump and massage. If worsening or not improving, needs clinic visit this week.   - cephALEXin (KEFLEX) 500 MG capsule; Take 1 capsule (500 mg) by mouth 4 times daily    Dry scalp  Referral per patient request. Symptoms for several years.  - DERMATOLOGY ADULT REFERRAL; Future        See Patient Instructions    Return in about 1 week (around 10/12/2020), or if symptoms worsen or fail to improve.     The benefits, risks and potential side effects were discussed in detail. Black box warnings discussed as relevant. All patient questions were answered.  The patient was instructed to follow up immediately if any adverse reactions develop.    Return precautions discussed, including when to seek urgent/emergent care.    Patient verbalizes understanding and agrees with plan of care.       PHANI Ritter CNP  [facility]      Video-Visit Details    Type of service:  Video Visit    Video End Time:2:25 pm    Originating Location (pt. Location): Home    Distant Location (provider location):  [facility]     Platform used for Video Visit: Chaz[employer]

## 2020-12-13 ENCOUNTER — HEALTH MAINTENANCE LETTER (OUTPATIENT)
Age: 35
End: 2020-12-13

## 2021-03-03 ENCOUNTER — TELEPHONE (OUTPATIENT)
Dept: DERMATOLOGY | Facility: CLINIC | Age: 36
End: 2021-03-03

## 2021-03-03 NOTE — TELEPHONE ENCOUNTER
[facility] Call Center    Phone Message    May a detailed message be left on voicemail: yes     Reason for Call: Other: pt returning call and needs help uploading pictures     Action Taken: Message routed to:  Adult Clinics: Dermatology p [id number]    Travel Screening: Not Applicable

## 2021-03-03 NOTE — TELEPHONE ENCOUNTER
I spoke with Eliz and offered her an in person appt.  She prefers to keep her appointment as a virtual.  I reviewed how to send photos and MyChart instructions sent as well.  All questions were answered.  Rajwinder Julian RN

## 2021-03-08 ENCOUNTER — VIRTUAL VISIT (OUTPATIENT)
Dept: DERMATOLOGY | Facility: CLINIC | Age: 36
End: 2021-03-08
Payer: COMMERCIAL

## 2021-03-08 ENCOUNTER — TELEPHONE (OUTPATIENT)
Dept: DERMATOLOGY | Facility: CLINIC | Age: 36
End: 2021-03-08

## 2021-03-08 DIAGNOSIS — L21.9 DERMATITIS, SEBORRHEIC: Primary | ICD-10-CM

## 2021-03-08 PROCEDURE — 99203 OFFICE O/P NEW LOW 30 MIN: CPT | Mod: 95 | Performed by: DERMATOLOGY

## 2021-03-08 RX ORDER — KETOCONAZOLE 20 MG/ML
SHAMPOO TOPICAL
Qty: 120 ML | Refills: 11 | Status: SHIPPED | OUTPATIENT
Start: 2021-03-08 | End: 2022-06-06

## 2021-03-08 RX ORDER — FLUOCINOLONE ACETONIDE 0.11 MG/ML
OIL TOPICAL
Qty: 118 ML | Refills: 11 | Status: SHIPPED | OUTPATIENT
Start: 2021-03-08 | End: 2022-06-06

## 2021-03-08 ASSESSMENT — PAIN SCALES - GENERAL: PAINLEVEL: NO PAIN (0)

## 2021-03-08 NOTE — PROGRESS NOTES
"[facility] Dermatology Note  Encounter Date: Mar 8, 2021  Video Visit (Marifer Connected). Start Time: 1:05 PM End Time: 1:20 PM    Dermatology Problem List:  1. Seborrheic dermatitis  - ketoconazole 2% shampoo / H&S or T-gel shampoo alternating  - fluocinolone 0.01% oil  - prior tx: selsen blue, green tea shampoo    ____________________________________________    Assessment & Plan:     # Seborrheic dermatitis. Etiology and chronic nature discussed. Recommended trial of topical regimen as below.  - Start ketoconazole 2% shampoo alternating with OTC shampoo such as H&S or T-gel shampoo.   - Start fluocinolone 0.01% oil at bedtime PRN flares    Procedures Performed:    None    Follow-up: 3 month(s) virtually (telephone with photos), or earlier for new or changing lesions    Staff:     Shreyas Batres MD  Pronouns: he/him/his    Department of Dermatology  [facility]: Phone: [phone], Fax:[phone]  [facility]: Phone: [phone] Fax: [phone]    ____________________________________________    CC: Derm Problem (Dry scalp and Dandruff ) and New Patient    HPI:  Ms. Eliz Fortune is a(n) 35 year old female who presents today as a new patient for evaluation of dandruff of the scalp. She reports a 20+ year history of intermittent redness, scale and occasionally \"scab-like\" lesions on the scalp, particularly the occipital scalp. This is associated with pruritus. No rashes elsewhere. No personal or family history of psoriasis. She has tried numerous OTC therapies including Green tea shampoos, H&S shampoo, selsen blue. She states green tea shampoo has been the most helpful. She is otherwise healthy and does not take any medications regularly.     Patient is otherwise feeling well, without additional skin concerns.    Labs Reviewed:  N/A    Physical Exam:  Vitals: There " were no vitals taken for this visit.  SKIN: Teledermatology photos were reviewed; image quality and interpretability: acceptable. Image date: 3/8/21.  - Mild pink erythema and white flaky scale on bilateral scalp.   - No other lesions of concern on areas examined.                                             Medications:  Current Outpatient Medications   Medication     albuterol (PROAIR HFA/PROVENTIL HFA/VENTOLIN HFA) 108 (90 BASE) MCG/ACT Inhaler     cephALEXin (KEFLEX) 500 MG capsule     cyclobenzaprine (FLEXERIL) 10 MG tablet     Prenatal Vit-Fe Fumarate-FA (PRENATAL MULTIVITAMIN PLUS IRON) 27-0.8 MG TABS per tablet     No current facility-administered medications for this visit.       Past Medical/Surgical History:   Patient Active Problem List   Diagnosis     Morbid obesity with BMI of 40.0-44.9, adult (H)     Past Medical History:   Diagnosis Date     Back strain 3/9/2013       CC PHANI Cabrera CNP  [address] [place] [address] on close of this encounter.

## 2021-03-08 NOTE — LETTER
"    3/8/2021         RE: Eliz Fortune  [address]  [place] [address]        Dear Colleague,    Thank you for referring your patient, Eliz Fortune, to the [facility]. Please see a copy of my visit note below.    [facility] Dermatology Note  Encounter Date: Mar 8, 2021  Video Visit (Mychart Connected). Start Time: 1:05 PM End Time: 1:20 PM    Dermatology Problem List:  1. Seborrheic dermatitis  - ketoconazole 2% shampoo / H&S or T-gel shampoo alternating  - fluocinolone 0.01% oil  - prior tx: selsen blue, green tea shampoo    ____________________________________________    Assessment & Plan:     # Seborrheic dermatitis. Etiology and chronic nature discussed. Recommended trial of topical regimen as below.  - Start ketoconazole 2% shampoo alternating with OTC shampoo such as H&S or T-gel shampoo.   - Start fluocinolone 0.01% oil at bedtime PRN flares    Procedures Performed:    None    Follow-up: 3 month(s) virtually (telephone with photos), or earlier for new or changing lesions    Staff:     Shreyas Batres MD  Pronouns: he/him/his    Department of Dermatology  [facility]: Phone: [phone], Fax:[phone]  [facility]: Phone: [phone] Fax: [phone]    ____________________________________________    CC: Derm Problem (Dry scalp and Dandruff ) and New Patient    HPI:  Ms. Eliz Fortune is a(n) 35 year old female who presents today as a new patient for evaluation of dandruff of the scalp. She reports a 20+ year history of intermittent redness, scale and occasionally \"scab-like\" lesions on the scalp, particularly the occipital scalp. This is associated with pruritus. No rashes elsewhere. No personal or family history of psoriasis. She has tried numerous OTC therapies including Green tea shampoos, H&S shampoo, selsen blue. She " states green tea shampoo has been the most helpful. She is otherwise healthy and does not take any medications regularly.     Patient is otherwise feeling well, without additional skin concerns.    Labs Reviewed:  N/A    Physical Exam:  Vitals: There were no vitals taken for this visit.  SKIN: Teledermatology photos were reviewed; image quality and interpretability: acceptable. Image date: 3/8/21.  - Mild pink erythema and white flaky scale on bilateral scalp.   - No other lesions of concern on areas examined.                                             Medications:  Current Outpatient Medications   Medication     albuterol (PROAIR HFA/PROVENTIL HFA/VENTOLIN HFA) 108 (90 BASE) MCG/ACT Inhaler     cephALEXin (KEFLEX) 500 MG capsule     cyclobenzaprine (FLEXERIL) 10 MG tablet     Prenatal Vit-Fe Fumarate-FA (PRENATAL MULTIVITAMIN PLUS IRON) 27-0.8 MG TABS per tablet     No current facility-administered medications for this visit.       Past Medical/Surgical History:   Patient Active Problem List   Diagnosis     Morbid obesity with BMI of 40.0-44.9, adult (H)     Past Medical History:   Diagnosis Date     Back strain 3/9/2013       CC Marva Chen, APRN CNP  [address] [place] [address] on close of this encounter.        Again, thank you for allowing me to participate in the care of your patient.        Sincerely,        Shreyas Batres MD

## 2021-03-08 NOTE — TELEPHONE ENCOUNTER
Start ketoconazole 2% shampoo alternating with OTC shampoo such as H&S or T-gel shampoo.     Called pharmacy and clarified with them    Monserrat Singh LPN on 3/8/2021 at 1:55 PM

## 2021-03-08 NOTE — PATIENT INSTRUCTIONS
[facility] Dermatology Visit    Thank you for allowing us to participate in your care. Your findings, instructions and follow-up plan are as follows:     Seborrheic dermatitis / dandruff.     1. Start using ketoconazole 2% shampoo alternating every other day with an over-the-counter dandruff shampoo such as Head and Shoulders (zinc-based) or Neutrogena T-gel (tar-based). Let sit for 1-2 minutes before rinsing.   2. Can use fluocinolone 0.01% oil at nighttime before bed as needed for flares. Can using nightly up to 1-2 weeks, then reduce dosing to once or twice weekly as maintenance therapy.   3. Follow-up in 3 months.     When should I call my doctor?    If you are worsening or not improving, please, contact us or seek urgent care as noted below.     Who should I call with questions (adults)?    [facility] (adult and pediatric): [phone]     [facility] (adult): [phone]    For urgent needs outside of business hours call the [facility] at [phone] and ask for the dermatology resident on call    If this is a medical emergency and you are unable to reach an ER, Call [phone]      Who should I call with questions (pediatric)?  [facility]- Pediatric Dermatology  Dr. Erica Hicks, Dr. Darshan Smalls, Dr. Vida Brown, Lizet Newton, PA  Dr. Dafne Bowling, Dr. Lilian Iglesias & Dr. Jamie Leiva  Non Urgent  Nurse Triage Line; [phone]- Mary Jo and Katie CHAMBERLAIN Care Coordinators   Olga (/Complex ) [phone]    If you need a prescription refill, please contact your pharmacy. Refills are approved or denied by our Physicians during normal business hours, Monday through Fridays  Per office policy, refills will not be granted if you have not been seen within the past year (or sooner depending on your child's condition)    Scheduling Information:  Pediatric Appointment  Scheduling and Call Center [phone]  Radiology Scheduling- [phone]  Sedation Unit Scheduling- [phone]  [place] Scheduling- General [phone]; Pediatric Dermatology [phone]  Main  Services: [phone]  Kinyarwanda: [phone]  English: [phone]  Hmong/Jhonatan/Togolese: [phone]  Preadmission Nursing Department Fax Number: [phone] (Fax all pre-operative paperwork to this number)    For urgent matters arising during evenings, weekends, or holidays that cannot wait for normal business hours please call [phone] and ask for the Dermatology Resident On-Call to be paged.

## 2021-03-08 NOTE — NURSING NOTE
Teledermatology Nurse Call Patients:     Are you  in the [facility] at the time of the encounter? yes    Today's visit will be billed to you and your insurance.    A teledermatology visit is not as thorough as an in-person visit and the quality of the photograph sent may not be of the same quality as that taken by the dermatology clinic.      Patient summary of issue:   Location of problem on body: Scalp   How long has area/symptoms been present:15 years, since she was a teenager.   What makes it better?: Blow drying hair after showers or keeping hair in a pony tail.   What makes it worse?: Sweating   Other symptoms include the following: Dry scalp with dandruff. Mild itching.   Which medications have been tried, for how long, and did they make it better or worse (ex. Triamcinolone, used twice daily for 2 weeks, not improved): No current or past treatments. She is currently using head and shoulders shampoo and herbal essence. Patient is washing her hair everyday.   The patient has not seen a dermatologist.   The patient hasno past medical history of skin cancer  ROS: The patient is generally feeling well.       LXIONG3, MEDICAL ASSISTANT

## 2021-03-08 NOTE — TELEPHONE ENCOUNTER
[facility] Call Center    Phone Message    May a detailed message be left on voicemail: yes     Reason for Call: Medication Question or concern regarding medication   Prescription Clarification  Name of Medication:   ketoconazole (NIZORAL) 2 % external shampoo 120 mL 11 3/8/2021  --   Sig: Lather onto        Prescribing Provider: Medardo   Pharmacy: [facility], [place]   What on the order needs clarification? Please call Pharmacy to let them know how often Pt is to use shampoo.  Thanks.    Action Taken: Message routed to:  Adult Clinics: Dermatology p [id number]    Travel Screening: Not Applicable

## 2021-04-17 ENCOUNTER — HEALTH MAINTENANCE LETTER (OUTPATIENT)
Age: 36
End: 2021-04-17

## 2021-05-18 ENCOUNTER — IMMUNIZATION (OUTPATIENT)
Dept: NURSING | Facility: CLINIC | Age: 36
End: 2021-05-18
Payer: COMMERCIAL

## 2021-05-18 PROCEDURE — 91300 PR COVID VAC PFIZER DIL RECON 30 MCG/0.3 ML IM: CPT

## 2021-05-18 PROCEDURE — 0001A PR COVID VAC PFIZER DIL RECON 30 MCG/0.3 ML IM: CPT

## 2021-06-07 ENCOUNTER — VIRTUAL VISIT (OUTPATIENT)
Dept: FAMILY MEDICINE | Facility: CLINIC | Age: 36
End: 2021-06-07
Payer: COMMERCIAL

## 2021-06-07 DIAGNOSIS — J30.2 SEASONAL ALLERGIC RHINITIS, UNSPECIFIED TRIGGER: Primary | ICD-10-CM

## 2021-06-07 PROCEDURE — 99213 OFFICE O/P EST LOW 20 MIN: CPT | Mod: GT | Performed by: NURSE PRACTITIONER

## 2021-06-07 RX ORDER — FLUTICASONE PROPIONATE 50 MCG
2 SPRAY, SUSPENSION (ML) NASAL DAILY
Qty: 16 ML | Refills: 1 | Status: SHIPPED | OUTPATIENT
Start: 2021-06-07 | End: 2021-06-28

## 2021-06-07 RX ORDER — CETIRIZINE HYDROCHLORIDE 10 MG/1
10 TABLET ORAL DAILY
Qty: 30 TABLET | Refills: 1 | Status: SHIPPED | OUTPATIENT
Start: 2021-06-07 | End: 2022-11-24

## 2021-06-07 RX ORDER — OLOPATADINE HYDROCHLORIDE 1 MG/ML
1 SOLUTION/ DROPS OPHTHALMIC 2 TIMES DAILY
Qty: 5 ML | Refills: 1 | Status: SHIPPED | OUTPATIENT
Start: 2021-06-07 | End: 2022-11-24

## 2021-06-07 NOTE — PROGRESS NOTES
Eliz is a 35 year old who is being evaluated via a billable video visit.      How would you like to obtain your AVS? MyChart  If the video visit is dropped, the invitation should be resent by: Text to cell phone: see demographics  Will anyone else be joining your video visit? No    Video Start Time: 8:21 am    Assessment & Plan     Seasonal allergic rhinitis, unspecified trigger  Avoid triggers as best as possible. Treatment with the below. Follow up in clinic if worsening or not improving.   - cetirizine (ZYRTEC) 10 MG tablet; Take 1 tablet (10 mg) by mouth daily  - fluticasone (FLONASE) 50 MCG/ACT nasal spray; Spray 2 sprays into both nostrils daily for 21 days  - olopatadine (PATANOL) 0.1 % ophthalmic solution; Place 1 drop into both eyes 2 times daily         See Patient Instructions    Return in about 2 weeks (around 6/21/2021), or if symptoms worsen or fail to improve.     The benefits, risks and potential side effects were discussed in detail. Black box warnings discussed as relevant. All patient questions were answered. The patient was instructed to follow up immediately if any adverse reactions develop.    Return precautions discussed, including when to seek urgent/emergent care.    Patient verbalizes understanding and agrees with plan of care. Patient stable for discharge.      PHANI Ritter [facility]    Ravinder Wellington is a 35 year old who presents for the following health issues     HPI       Pleasant 35 year old female initiated a virtual visit to discuss allergies. Yesterday her and her family went to the park to take photos and afterward she had puffy eyes, runny nose and itchy nose. She gets this every year. She took loratidine (Claritin) without much relief. Sometimes she takes diphenhydramine (Benadryl) as well.  Not pregnant or breastfeeding. No fever. No exposures to covid.     Review of Systems   Constitutional, HEENT, cardiovascular, pulmonary,  gi and gu systems are negative, except as otherwise noted.      Objective           Vitals:  No vitals were obtained today due to virtual visit.    Physical Exam   GENERAL: Healthy, alert and no distress  EYES: Eyes grossly normal to inspection.  No discharge or erythema, or obvious scleral/conjunctival abnormalities.  RESP: No audible wheeze, cough, or visible cyanosis.  No visible retractions or increased work of breathing.    SKIN: Visible skin clear. No significant rash, abnormal pigmentation or lesions.  NEURO: Cranial nerves grossly intact.  Mentation and speech appropriate for age.  PSYCH: Mentation appears normal, affect normal/bright, judgement and insight intact, normal speech and appearance well-groomed.                Video-Visit Details    Type of service:  Video Visit    Video End Time:8:28 AM    Originating Location (pt. Location): Home    Distant Location (provider location):  [facility]     Platform used for Video Visit: [employer]

## 2021-06-07 NOTE — PATIENT INSTRUCTIONS
Patient Education     Allergic Rhinitis  Allergic rhinitis is an allergic reaction that affects the nose, and often the eyes. It s often known as nasal allergies. Nasal allergies are often due to things in the environment that are breathed in. Depending what you are sensitive to, nasal allergies may occur only during certain seasons, or they may occur year round. Common indoor allergens include house dust mites, mold, cockroaches, and pet dander. Outdoor allergens include pollen from trees, grasses, and weeds.    Symptoms include a drippy, stuffy, and itchy nose. They also include sneezing and red and itchy eyes. You may feel tired more often. Severe allergies may also affect your breathing and trigger a condition called asthma.    Tests can be done to see what allergens are affecting you. You may be referred to an allergy specialist for testing and further evaluation.   Home care  Your healthcare provider may prescribe medicines to help relieve allergy symptoms. These may include oral medicines, nasal sprays, or eye drops.   Ask your provider for advice on how to stay away from substances that you are allergic to. Below are a few tips for each type of allergen.   Pet dander:    Do not have pets with fur and feathers.    If you have a pet, keep it out of your bedroom and off upholstered furniture.  Pollen:    When pollen counts are high, keep windows of your car and home closed. If possible, use an air conditioner instead.    Wear a filter mask when mowing or doing yard work.  House dust mites:    Wash bedding every week in warm water and detergent and dry on a hot setting.    Cover the mattress, box spring, and pillows with allergy covers.     If possible, sleep in a room with no carpet, curtains, or upholstered furniture.  Cockroaches:    Store food in sealed containers.    Remove garbage from the home promptly.    Fix water leaks.  Mold:    Keep humidity low by using a dehumidifier or air conditioner. Keep the  dehumidifier and air conditioner clean and free of mold.    Clean moldy areas with bleach and water. Don't mix bleach with other .  In general:    Vacuum once or twice a week. If possible, use a vacuum with a high-efficiency particulate air (HEPA) filter.    Don't smoke. Stay away from cigarette smoke. Cigarette smoke is an irritant that can make symptoms worse.  Follow-up care  Follow up as advised by the healthcare provider or our staff. If you were referred to an allergy specialist, make this appointment promptly.   When to seek medical advice  Call your healthcare provider or get medical care right away if the following occur:     Coughing    Fever of 100.4 F (38 C) or higher, or as directed by your healthcare provider    Raised red bumps (hives)    Continuing symptoms, new symptoms, or worsening symptoms  Call 911  Call 911 if you have:     Trouble breathing    Severe swelling of the face or severe itching of the eyes or mouth    Wheezing or shortness of breath    Chest tightness    Dizziness or lightheadedness    Feeling of doom    Stomach pain, bloating, vomiting, or diarrhea  [employer] last reviewed this educational content on 10/1/2019    2776-0852 [employer]. All rights reserved. This information is not intended as a substitute for professional medical care. Always follow your healthcare professional's instructions.

## 2021-06-08 ENCOUNTER — IMMUNIZATION (OUTPATIENT)
Dept: NURSING | Facility: CLINIC | Age: 36
End: 2021-06-08
Attending: INTERNAL MEDICINE
Payer: COMMERCIAL

## 2021-06-08 PROCEDURE — 0002A PR COVID VAC PFIZER DIL RECON 30 MCG/0.3 ML IM: CPT

## 2021-06-08 PROCEDURE — 91300 PR COVID VAC PFIZER DIL RECON 30 MCG/0.3 ML IM: CPT

## 2021-09-26 ENCOUNTER — HEALTH MAINTENANCE LETTER (OUTPATIENT)
Age: 36
End: 2021-09-26

## 2022-01-16 ENCOUNTER — HEALTH MAINTENANCE LETTER (OUTPATIENT)
Age: 37
End: 2022-01-16

## 2022-03-08 DIAGNOSIS — L21.9 DERMATITIS, SEBORRHEIC: ICD-10-CM

## 2022-03-10 RX ORDER — FLUOCINOLONE ACETONIDE 0.11 MG/ML
OIL TOPICAL
Qty: 118.28 ML | Refills: 0 | OUTPATIENT
Start: 2022-03-10

## 2022-03-10 RX ORDER — KETOCONAZOLE 20 MG/ML
SHAMPOO TOPICAL
Qty: 120 ML | Refills: 0 | OUTPATIENT
Start: 2022-03-10

## 2022-03-10 NOTE — TELEPHONE ENCOUNTER
3/10 Called and spoke to patient. She is currently scheduled for this follow up appointment.     Gisselle gunderson Procedure   Orthopedics, Podiatry, Sports Medicine, ENT/Eye Specialties  [facility]   [phone]

## 2022-03-10 NOTE — TELEPHONE ENCOUNTER
Last Clinic Visit: Dermatology  3/8/2021  [facility]  Recommended 3 month follow up, no upcoming appointments.  Ketoconazole refill declined per derm protocol process #1  Fluocinolone refill declined per derm protocol process #2  Routed to clinic scheduling for follow up

## 2022-06-06 ENCOUNTER — VIRTUAL VISIT (OUTPATIENT)
Dept: DERMATOLOGY | Facility: CLINIC | Age: 37
End: 2022-06-06
Payer: COMMERCIAL

## 2022-06-06 DIAGNOSIS — L21.9 DERMATITIS, SEBORRHEIC: Primary | ICD-10-CM

## 2022-06-06 DIAGNOSIS — L81.1 MELASMA: ICD-10-CM

## 2022-06-06 PROCEDURE — 99214 OFFICE O/P EST MOD 30 MIN: CPT | Mod: 95 | Performed by: DERMATOLOGY

## 2022-06-06 RX ORDER — TRETINOIN 0.25 MG/G
CREAM TOPICAL
Qty: 45 G | Refills: 11 | Status: SHIPPED | OUTPATIENT
Start: 2022-06-06 | End: 2022-06-15

## 2022-06-06 RX ORDER — KETOCONAZOLE 20 MG/G
CREAM TOPICAL
Qty: 60 G | Refills: 11 | Status: SHIPPED | OUTPATIENT
Start: 2022-06-06 | End: 2022-11-24

## 2022-06-06 RX ORDER — FLUOCINOLONE ACETONIDE 0.11 MG/ML
OIL TOPICAL
Qty: 118 ML | Refills: 11 | Status: SHIPPED | OUTPATIENT
Start: 2022-06-06 | End: 2022-11-24

## 2022-06-06 RX ORDER — KETOCONAZOLE 20 MG/ML
SHAMPOO TOPICAL
Qty: 120 ML | Refills: 11 | Status: SHIPPED | OUTPATIENT
Start: 2022-06-06 | End: 2022-11-24

## 2022-06-06 NOTE — PROGRESS NOTES
[facility] Dermatology Note  Encounter Date: Jun 6, 2022  Store-and-Forward and Telephone ([phone]). Location of teledermatologist: [facility].  Start time: 3:42 PM. End time: 3:58 PM.    Dermatology Problem List:  1. Seborrheic dermatitis  - ketoconazole 2% shampoo / H&S or T-gel shampoo alternating  - fluocinolone 0.01% oil  - prior tx: selsen blue, green tea shampoo   ____________________________________________     Assessment & Plan:      # Seborrheic dermatitis. Chronic/flare not at goal. Scalp improved, some new involvement on central face.   - Continue ketoconazole 2% shampoo  - Continue fluocinolone 0.01% oil at bedtime once weekly as maintenance; can increase use to daily PRN flares  - Start ketoconazole 2% cream BID PRN for face    # Melasma, cheeks/forehead. Chronic, flare.   - Recommended SPF 30 or greater sunscreen used at least daily  - Start tretinoin 0.025% cream at bedtime  - Consider hydroquinone at follow-up    # Hyperpigmentation in bilateral axillae/groin. Recommended in-person examination. Will arrange for in-person visit at [facility] in [place].     Procedures Performed:   None    Follow-up: 6 week(s) in-person, or earlier for new or changing lesions    Staff and Scribe:     Provider Disclosure:   The documentation recorded by the scribe accurately reflects the services I personally performed and the decisions made by me.    Shreyas Batres MD    Department of Dermatology  [facility]: Phone: [phone], Fax:[phone]  [facility]: Phone: [phone] Fax: [phone]  ____________________________________________    CC: Derm Problem (dandruff)    HPI:  Ms. Eliz N Fortune is a(n) 36 year old female who presents today as a return patient for seborrheic dermatitis.    Last seen 3/8/21 for seborrheic dermatitis. Started  on ketoconazole and fluocinolone at that time.    Today, patient reports that scalp was improved. She is using ketoconazole 2% shampoo every other day and fluocinolone 0.01% oil occasionally (once every 2 weeks). She does report new involved of scaly pink rash on the central face surrounding the nose.     Also reports a few additional concerns includin. Melasma on the face. States this worsened after her last pregnancy. She does not wear sunscreen regularly. Has not yet tried any treatment.     2. Hyperpigmentation in axillae and groin. No history of diabetes. Has not yet tried any treatment.     Patient is otherwise feeling well, without additional skin concerns.    Labs Reviewed:  N/A    Physical Exam:  Vitals: There were no vitals taken for this visit.  SKIN: Teledermatology photos were reviewed; image quality and interpretability: acceptable.   - Mild white flaky scale on scalp; appears improved from prior.    - No other lesions of concern on areas examined.                         Medications:  Current Outpatient Medications   Medication     cetirizine (ZYRTEC) 10 MG tablet     fluocinolone acetonide (DERMA SMOOTHE/FS BODY) 0.01 % external oil     ketoconazole (NIZORAL) 2 % external shampoo     olopatadine (PATANOL) 0.1 % ophthalmic solution     No current facility-administered medications for this visit.      Past Medical History:   Patient Active Problem List   Diagnosis     Morbid obesity with BMI of 40.0-44.9, adult (H)     Past Medical History:   Diagnosis Date     Back strain 3/9/2013

## 2022-06-06 NOTE — LETTER
6/6/2022         RE: Eliz Fortune  [address]  [facility] [place] [address]        Dear Colleague,    Thank you for referring your patient, Eliz Fortune, to the [facility]. Please see a copy of my visit note below.    [facility] Dermatology Note  Encounter Date: Jun 6, 2022  Store-and-Forward and Telephone ([phone]). Location of teledermatologist: [facility].  Start time: 3:42 PM. End time: 3:58 PM.    Dermatology Problem List:  1. Seborrheic dermatitis  - ketoconazole 2% shampoo / H&S or T-gel shampoo alternating  - fluocinolone 0.01% oil  - prior tx: selsen blue, green tea shampoo   ____________________________________________     Assessment & Plan:      # Seborrheic dermatitis. Chronic/flare not at goal. Scalp improved, some new involvement on central face.   - Continue ketoconazole 2% shampoo  - Continue fluocinolone 0.01% oil at bedtime once weekly as maintenance; can increase use to daily PRN flares  - Start ketoconazole 2% cream BID PRN for face    # Melasma, cheeks/forehead. Chronic, flare.   - Recommended SPF 30 or greater sunscreen used at least daily  - Start tretinoin 0.025% cream at bedtime  - Consider hydroquinone at follow-up    # Hyperpigmentation in bilateral axillae/groin. Recommended in-person examination. Will arrange for in-person visit at [facility] in [place].     Procedures Performed:   None    Follow-up: 6 week(s) in-person, or earlier for new or changing lesions    Staff and Scribe:     Provider Disclosure:   The documentation recorded by the scribe accurately reflects the services I personally performed and the decisions made by me.    Shreyas Batres MD    Department of Dermatology  [facility]: Phone: [phone], Fax:[phone]  [facility]: Phone: [phone] Fax:  [phone]  ____________________________________________    CC: Derm Problem (dandruff)    HPI:  Ms. Eliz Fortune is a(n) 36 year old female who presents today as a return patient for seborrheic dermatitis.    Last seen 3/8/21 for seborrheic dermatitis. Started on ketoconazole and fluocinolone at that time.    Today, patient reports that scalp was improved. She is using ketoconazole 2% shampoo every other day and fluocinolone 0.01% oil occasionally (once every 2 weeks). She does report new involved of scaly pink rash on the central face surrounding the nose.     Also reports a few additional concerns includin. Melasma on the face. States this worsened after her last pregnancy. She does not wear sunscreen regularly. Has not yet tried any treatment.     2. Hyperpigmentation in axillae and groin. No history of diabetes. Has not yet tried any treatment.     Patient is otherwise feeling well, without additional skin concerns.    Labs Reviewed:  N/A    Physical Exam:  Vitals: There were no vitals taken for this visit.  SKIN: Teledermatology photos were reviewed; image quality and interpretability: acceptable.   - Mild white flaky scale on scalp; appears improved from prior.    - No other lesions of concern on areas examined.                         Medications:  Current Outpatient Medications   Medication     cetirizine (ZYRTEC) 10 MG tablet     fluocinolone acetonide (DERMA SMOOTHE/FS BODY) 0.01 % external oil     ketoconazole (NIZORAL) 2 % external shampoo     olopatadine (PATANOL) 0.1 % ophthalmic solution     No current facility-administered medications for this visit.      Past Medical History:   Patient Active Problem List   Diagnosis     Morbid obesity with BMI of 40.0-44.9, adult (H)     Past Medical History:   Diagnosis Date     Back strain 3/9/2013            Again, thank you for allowing me to participate in the care of your patient.        Sincerely,        Shreyas Batres MD

## 2022-06-06 NOTE — NURSING NOTE
Teledermatology Nurse Call Patients:     Are you in the [facility] at the time of the encounter? yes    Today's visit will be billed to you and your insurance.    A teledermatology visit is not as thorough as an in-person visit and the quality of the photograph sent may not be of the same quality as that taken by the dermatology clinic.  Claudia Trejo, EKTA on 6/6/2022 at 3:30 PM

## 2022-06-07 ENCOUNTER — TELEPHONE (OUTPATIENT)
Dept: DERMATOLOGY | Facility: CLINIC | Age: 37
End: 2022-06-07
Payer: COMMERCIAL

## 2022-06-07 ENCOUNTER — MYC MEDICAL ADVICE (OUTPATIENT)
Dept: DERMATOLOGY | Facility: CLINIC | Age: 37
End: 2022-06-07
Payer: COMMERCIAL

## 2022-06-07 DIAGNOSIS — L81.1 MELASMA: ICD-10-CM

## 2022-06-07 NOTE — TELEPHONE ENCOUNTER
The Tretinoin 0.025% Cream was rejected due to patient age. What would you suggest?    Sly Emerson  In Clinic Visit Facilitator

## 2022-06-15 RX ORDER — TRETINOIN 0.25 MG/G
CREAM TOPICAL
Qty: 45 G | Refills: 11 | Status: SHIPPED | OUTPATIENT
Start: 2022-06-15 | End: 2022-11-24

## 2022-06-15 NOTE — TELEPHONE ENCOUNTER
Re-sent tretinoin 0.025% external cream prescription to pharmacy requested by patient.    Makenna Briseno, MSN, RN  [facility]

## 2022-06-21 ENCOUNTER — TELEPHONE (OUTPATIENT)
Dept: DERMATOLOGY | Facility: CLINIC | Age: 37
End: 2022-06-21

## 2022-06-21 NOTE — TELEPHONE ENCOUNTER
PA Initiation    Medication: tretinoin (RETIN-A) 0.025 % external cream   Insurance Company: Other (see comments)  Pharmacy Filling the Rx: [id number] - [place], [place] - [address] [place] [address]  Filling Pharmacy Phone: [phone]  Filling Pharmacy Fax: [phone]  Start Date: 6/21/2022

## 2022-06-21 NOTE — TELEPHONE ENCOUNTER
Please initiate prior authorization for Tretinoin 0.025% Cream. Thank you!    Sly Emerson  In Clinic Visit Facilitator

## 2022-06-22 NOTE — TELEPHONE ENCOUNTER
PRIOR AUTHORIZATION DENIED    Medication: tretinoin (RETIN-A) 0.025 % external cream--DENIED    Denial Date: 6/22/2022    Denial Rational: Patient is not using to treat acne vulgaris or keratosis follicularis.     Appeal Information:

## 2022-11-02 ENCOUNTER — OFFICE VISIT (OUTPATIENT)
Dept: URGENT CARE | Facility: URGENT CARE | Age: 37
End: 2022-11-02
Payer: COMMERCIAL

## 2022-11-02 VITALS
DIASTOLIC BLOOD PRESSURE: 74 MMHG | WEIGHT: 227 LBS | TEMPERATURE: 97.6 F | OXYGEN SATURATION: 98 % | BODY MASS INDEX: 40.21 KG/M2 | SYSTOLIC BLOOD PRESSURE: 114 MMHG | HEART RATE: 63 BPM

## 2022-11-02 DIAGNOSIS — M54.50 ACUTE BILATERAL LOW BACK PAIN WITHOUT SCIATICA: Primary | ICD-10-CM

## 2022-11-02 PROCEDURE — 99213 OFFICE O/P EST LOW 20 MIN: CPT | Performed by: PHYSICIAN ASSISTANT

## 2022-11-02 RX ORDER — METHYLPREDNISOLONE 4 MG
TABLET, DOSE PACK ORAL
Qty: 21 TABLET | Refills: 0 | Status: SHIPPED | OUTPATIENT
Start: 2022-11-02 | End: 2022-11-24

## 2022-11-02 RX ORDER — CYCLOBENZAPRINE HCL 10 MG
10 TABLET ORAL 3 TIMES DAILY PRN
Qty: 30 TABLET | Refills: 0 | Status: SHIPPED | OUTPATIENT
Start: 2022-11-02 | End: 2022-11-24

## 2022-11-02 NOTE — PROGRESS NOTES
Acute bilateral low back pain without sciatica  - methylPREDNISolone (MEDROL DOSEPAK) 4 MG tablet therapy pack; Follow Package Directions  - cyclobenzaprine (FLEXERIL) 10 MG tablet; Take 1 tablet (10 mg) by mouth 3 times daily as needed for muscle spasms    Rest the affected area as much as possible.  Apply ice for 15-20 minutes intermittently as needed and especially after any offending activity. Hot packs are better for muscle spasms and cramping. Daily stretching as tolerated.  As pain recedes, begin normal activities slowly as tolerated.  Consider Physical Therapy after 6 weeks if symptoms not better with conservative care.      Okay to take acetaminophen 500 mg- 2 tabs (Total of 1000 mg) every 8 hrs   Okay to take ibuprofen 200 mg- 3 tabs (Total of 600 mg) every 6 hours          [facility]LEG[facility] Silverio [facility]    Subjective   37 year old who presents to clinic today for the following health issues:    Back Pain     HPI     Pain History:  When did you first notice your pain? - Acute Pain   Where in your body do you have pain? 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   Description: no Location of pain: low back Middle   Character of pain: pinching pain and pressure   Pain radiation: none  New numbness or weakness in legs, not attributed to pain: no   Intensity: moderate to severe  Progression of Symptoms: waxing and waning  History:   Specific cause: Patient attempted to lift a heavy garage door 6-7 years ago when she first experienced this pain. Has not felt it since.   Pain interferes with job:  no   History of back problems: recurrent self limited episodes of low back pain in the past  Any previous MRI or X-rays: None  Sees a specialist for back pain: No  Precipitating factors:  Worsened by: Leaning back and  standing up straight   Therapies tried and outcome: NSAIDs- Hot pack at work.     Review of Systems   Review of Systems   See HPI    Objective    Temp: 97.6  F (36.4  C) Temp src: Tympanic BP: 114/74 Pulse: 63     SpO2: 98 %       Physical Exam   Physical Exam  Constitutional:       General: She is not in acute distress.     Appearance: Normal appearance. She is normal weight. She is not ill-appearing, toxic-appearing or diaphoretic.   HENT:      Head: Normocephalic and atraumatic.   Cardiovascular:      Rate and Rhythm: Normal rate.      Pulses: Normal pulses.   Pulmonary:      Effort: Pulmonary effort is normal. No respiratory distress.   Musculoskeletal:        Back:       Comments: Patient has movement pain in the area shown above without deformities, swelling, or focal tenderness.   Neurological:      Mental Status: She is alert.   Psychiatric:         Mood and Affect: Mood normal.         Behavior: Behavior normal.         Thought Content: Thought content normal.         Judgment: Judgment normal.          No results found for this or any previous visit (from the past 24 hour(s)).

## 2022-11-24 ENCOUNTER — OFFICE VISIT (OUTPATIENT)
Dept: URGENT CARE | Facility: URGENT CARE | Age: 37
End: 2022-11-24
Payer: COMMERCIAL

## 2022-11-24 VITALS
OXYGEN SATURATION: 99 % | WEIGHT: 227.7 LBS | SYSTOLIC BLOOD PRESSURE: 118 MMHG | BODY MASS INDEX: 40.34 KG/M2 | HEART RATE: 86 BPM | TEMPERATURE: 98.5 F | DIASTOLIC BLOOD PRESSURE: 76 MMHG

## 2022-11-24 DIAGNOSIS — R05.2 SUBACUTE COUGH: Primary | ICD-10-CM

## 2022-11-24 PROCEDURE — 99213 OFFICE O/P EST LOW 20 MIN: CPT

## 2022-11-24 RX ORDER — BENZONATATE 200 MG/1
200 CAPSULE ORAL 3 TIMES DAILY PRN
Qty: 21 CAPSULE | Refills: 1 | Status: SHIPPED | OUTPATIENT
Start: 2022-11-24

## 2022-11-24 NOTE — PATIENT INSTRUCTIONS
Take the Tessalon as prescribed.  You can also try the natural cough remedy as pictured below.  Follow up with a primary care provider to discuss the ongoing cough to explore evaluation and treatment beyond an infectious process.

## 2022-11-24 NOTE — PROGRESS NOTES
ASSESSMENT:  (R05.2) Subacute cough  (primary encounter diagnosis)  Plan: benzonatate (TESSALON) 200 MG capsule    PLAN:  Informed the patient that given the continued symptoms over the past 3 or more weeks she should follow-up with her primary care provider to explore evaluation and treatment beyond what would be considered an infectious process.  We discussed taking Tessalon as prescribed and also trying a natural cough remedy as pictured in the wrap-up section under My-chart.  Informed the patient to return to clinic with any new or worsening symptoms.  Patient acknowledged her understanding of the above plan.    Satinder Vences, APRN CNP      SUBJECTIVE:  Eliz Fortune is a 37 year old female who presents to the clinic today with a chief complaint of cough  for 3 week(s).  Her cough is described as dry.  The patient's symptoms are severe and worsening.  Associated symptoms include fever, nasal congestion and headache. The patient's symptoms are exacerbated by lying down  Patient has been using Robitussin DM, cough drops and Delsym  to improve symptoms.    At home COVID test negative.     ROS  Review of systems negative except as stated above.    OBJECTIVE:  /76 (BP Location: Right arm, Patient Position: Sitting, Cuff Size: Adult Large)   Pulse 86   Temp 98.5  F (36.9  C) (Tympanic)   Wt 103.3 kg (227 lb 11.2 oz)   SpO2 99%   BMI 40.34 kg/m    GENERAL APPEARANCE: healthy, alert and no distress  EYES: EOMI,  PERRL, conjunctiva clear  HENT: ear canals and TM's normal.  Nose and mouth without ulcers, erythema or lesions  NECK: supple, nontender, no lymphadenopathy  RESP: lungs clear to auscultation - no rales, rhonchi or wheezes  CV: regular rates and rhythm, normal S1 S2, no murmur noted  NEURO: Normal strength and tone, sensory exam grossly normal,  normal speech and mentation  SKIN: no suspicious lesions or rashes

## 2023-04-23 ENCOUNTER — HEALTH MAINTENANCE LETTER (OUTPATIENT)
Age: 38
End: 2023-04-23

## 2024-06-30 ENCOUNTER — HEALTH MAINTENANCE LETTER (OUTPATIENT)
Age: 39
End: 2024-06-30

## 2024-11-17 ENCOUNTER — HEALTH MAINTENANCE LETTER (OUTPATIENT)
Age: 39
End: 2024-11-17